# Patient Record
Sex: FEMALE | Race: WHITE | HISPANIC OR LATINO
[De-identification: names, ages, dates, MRNs, and addresses within clinical notes are randomized per-mention and may not be internally consistent; named-entity substitution may affect disease eponyms.]

---

## 2024-05-30 ENCOUNTER — APPOINTMENT (OUTPATIENT)
Dept: SURGERY | Facility: CLINIC | Age: 59
End: 2024-05-30
Payer: COMMERCIAL

## 2024-05-30 VITALS
HEIGHT: 59 IN | OXYGEN SATURATION: 94 % | DIASTOLIC BLOOD PRESSURE: 83 MMHG | SYSTOLIC BLOOD PRESSURE: 137 MMHG | WEIGHT: 128 LBS | HEART RATE: 85 BPM | BODY MASS INDEX: 25.8 KG/M2

## 2024-05-30 DIAGNOSIS — R79.89 OTHER SPECIFIED ABNORMAL FINDINGS OF BLOOD CHEMISTRY: ICD-10-CM

## 2024-05-30 PROCEDURE — 99204 OFFICE O/P NEW MOD 45 MIN: CPT

## 2024-05-30 NOTE — PLAN
[FreeTextEntry1] : Ms. AMANDA  was told significance of findings, options, risks and benefits were explained.  Informed consent for laparoscopic/possible open  cholecystectomy  and potential risks, benefits and alternatives (surgical options were discussed including non-surgical options or the option of no surgery) to the planned surgery were discussed in depth.  All surgical options were discussed including non-surgical treatments.  She wishes to proceed with surgery.  We will plan for surgery on at the next available date, pending any required insurance pre-certification or pre-approval. She agrees to obtain any necessary pre-operative evaluations and testing prior to surgery. Patient instructed to maintain a fat-free diet, and to seek immediate medical attention with any acute change or worsening of symptoms, including but not limited to abdominal pain, fever, chills, nausea, vomiting, or yellowing of the skin.  Patient advised to seek immediate medical attention with any acute change in symptoms or with the development of any new or worsening symptoms.  Patient's questions and concerns addressed to patient's satisfaction, and patient verbalized an understanding of the information discussed.

## 2024-05-30 NOTE — PHYSICAL EXAM
[Abdominal Masses] : No abdominal masses [Abdomen Tenderness] : ~T ~M No abdominal tenderness [Alert] : alert [Oriented to Person] : oriented to person [Oriented to Place] : oriented to place [Oriented to Time] : oriented to time [Calm] : calm [de-identified] : She  is alert, well-groomed, and in NAD   [de-identified] : anicteric.  Nasal mucosa pink, septum midline. Oral mucosa pink.  Tongue midline, Pharynx without exudates.   [de-identified] : Neck supple. Trachea midline. Thyroid isthmus barely palpable, lobes not felt.   [de-identified] :  no hernia

## 2024-05-30 NOTE — HISTORY OF PRESENT ILLNESS
[de-identified] : Ms. HILDA AMANDA is a 58 year  old patient who was referred by Dr. David Prasad  with the chief complaint of having GB stones.  she has no symptoms.     She reports no nausea or vomiting and no history of jaundice, acholia or acholuria.   Appetite is good and weight is stable.   She   has no family history of biliary tract disease.   She had an abdominal sonogram due to the elevated LFTs on  03/26/2024 which revealed multiple GB stones. CBD is normal

## 2024-05-30 NOTE — CONSULT LETTER
[Dear  ___] : Dear  [unfilled], [Consult Letter:] : I had the pleasure of evaluating your patient, [unfilled]. [Please see my note below.] : Please see my note below. [Consult Closing:] : Thank you very much for allowing me to participate in the care of this patient.  If you have any questions, please do not hesitate to contact me. [Sincerely,] : Sincerely, [FreeTextEntry3] : Devante Ly MD, FACS

## 2024-06-13 ENCOUNTER — OUTPATIENT (OUTPATIENT)
Dept: OUTPATIENT SERVICES | Facility: HOSPITAL | Age: 59
LOS: 1 days | End: 2024-06-13
Payer: COMMERCIAL

## 2024-06-13 VITALS
HEIGHT: 59 IN | RESPIRATION RATE: 16 BRPM | HEART RATE: 67 BPM | OXYGEN SATURATION: 98 % | WEIGHT: 125 LBS | TEMPERATURE: 98 F | SYSTOLIC BLOOD PRESSURE: 131 MMHG | DIASTOLIC BLOOD PRESSURE: 83 MMHG

## 2024-06-13 DIAGNOSIS — K81.9 CHOLECYSTITIS, UNSPECIFIED: ICD-10-CM

## 2024-06-13 DIAGNOSIS — Z01.818 ENCOUNTER FOR OTHER PREPROCEDURAL EXAMINATION: ICD-10-CM

## 2024-06-13 LAB — BLD GP AB SCN SERPL QL: SIGNIFICANT CHANGE UP

## 2024-06-13 NOTE — H&P PST ADULT - NSANTHOSAYNRD_GEN_A_CORE
No. NARCISO screening performed.  STOP BANG Legend: 0-2 = LOW Risk; 3-4 = INTERMEDIATE Risk; 5-8 = HIGH Risk

## 2024-06-13 NOTE — H&P PST ADULT - PROBLEM SELECTOR PLAN 1
Scheduled for laparoscopic cholecystectomy with intra-operative cholangiogram possible open on 6/19/2024  Preoperative instructions discussed and given to patient.   Patient agrees to follow up with surgeon's office for instructions prior to surgery   Discussed preprocedure skin preparation using  chlorhexidine gluconate 4% solution three days prior to  surgery - including the day of surgery  Instructed patient to avoid aspirin and aspirin products, over the counter medications such as vitamins and herbal medications, one week prior to surgery.  Take Tylenol as needed for pain  Follow up with PCP postoperatively for management of chronic conditions  Patient verbalized understanding of instructions

## 2024-06-13 NOTE — H&P PST ADULT - NSICDXPROCEDURE_GEN_ALL_CORE_FT
PROCEDURES:  Cholecystectomy, laparoscopic, with intraoperative cholangiogram 13-Jun-2024 17:14:30  Sandee Pérez

## 2024-06-13 NOTE — H&P PST ADULT - ASSESSMENT
60 y/o female with no significant PMHx was found to have elevated LFTs on routine labwork. F/U Abdominal Sonogram of 3/26/2024 revealed gallstones. She is diagnosed with cholecystitis, unspecified and is scheduled for laparoscopic cholecystectomy with intra-operative cholangiogram possible open on 6/19/2024 58 y/o female with no significant PMHx was found to have elevated LFTs on routine labwork. F/U Abdominal Sonogram of 3/26/2024 revealed gallstones. She is diagnosed with cholecystitis, unspecified     STOP BANG SCORE IS 1 60 y/o female with PMHx of HSV11 (valtrex) and seasonal allergies is diagnosed with cholecystitis, unspecified     STOP BANG SCORE IS 1

## 2024-06-14 PROCEDURE — 86850 RBC ANTIBODY SCREEN: CPT

## 2024-06-14 PROCEDURE — 86901 BLOOD TYPING SEROLOGIC RH(D): CPT

## 2024-06-14 PROCEDURE — 36415 COLL VENOUS BLD VENIPUNCTURE: CPT

## 2024-06-14 PROCEDURE — G0463: CPT

## 2024-06-14 PROCEDURE — 86900 BLOOD TYPING SEROLOGIC ABO: CPT

## 2024-06-18 ENCOUNTER — TRANSCRIPTION ENCOUNTER (OUTPATIENT)
Age: 59
End: 2024-06-18

## 2024-06-19 ENCOUNTER — APPOINTMENT (OUTPATIENT)
Dept: SURGERY | Facility: HOSPITAL | Age: 59
End: 2024-06-19

## 2024-06-19 ENCOUNTER — RESULT REVIEW (OUTPATIENT)
Age: 59
End: 2024-06-19

## 2024-06-19 ENCOUNTER — TRANSCRIPTION ENCOUNTER (OUTPATIENT)
Age: 59
End: 2024-06-19

## 2024-06-19 ENCOUNTER — OUTPATIENT (OUTPATIENT)
Dept: OUTPATIENT SERVICES | Facility: HOSPITAL | Age: 59
LOS: 1 days | End: 2024-06-19
Payer: COMMERCIAL

## 2024-06-19 VITALS
DIASTOLIC BLOOD PRESSURE: 85 MMHG | HEIGHT: 59 IN | SYSTOLIC BLOOD PRESSURE: 146 MMHG | TEMPERATURE: 98 F | WEIGHT: 125 LBS | HEART RATE: 80 BPM | RESPIRATION RATE: 16 BRPM | OXYGEN SATURATION: 98 %

## 2024-06-19 VITALS
SYSTOLIC BLOOD PRESSURE: 110 MMHG | TEMPERATURE: 97 F | OXYGEN SATURATION: 98 % | RESPIRATION RATE: 18 BRPM | DIASTOLIC BLOOD PRESSURE: 62 MMHG | HEART RATE: 56 BPM

## 2024-06-19 DIAGNOSIS — K81.9 CHOLECYSTITIS, UNSPECIFIED: ICD-10-CM

## 2024-06-19 LAB — BLD GP AB SCN SERPL QL: SIGNIFICANT CHANGE UP

## 2024-06-19 PROCEDURE — 88304 TISSUE EXAM BY PATHOLOGIST: CPT | Mod: 26

## 2024-06-19 PROCEDURE — 47563 LAPARO CHOLECYSTECTOMY/GRAPH: CPT

## 2024-06-19 PROCEDURE — 88304 TISSUE EXAM BY PATHOLOGIST: CPT

## 2024-06-19 PROCEDURE — 76000 FLUOROSCOPY <1 HR PHYS/QHP: CPT

## 2024-06-19 PROCEDURE — 47563 LAPARO CHOLECYSTECTOMY/GRAPH: CPT | Mod: AS

## 2024-06-19 PROCEDURE — 36415 COLL VENOUS BLD VENIPUNCTURE: CPT

## 2024-06-19 PROCEDURE — 86850 RBC ANTIBODY SCREEN: CPT

## 2024-06-19 PROCEDURE — 86900 BLOOD TYPING SEROLOGIC ABO: CPT

## 2024-06-19 PROCEDURE — 86901 BLOOD TYPING SEROLOGIC RH(D): CPT

## 2024-06-19 DEVICE — CLIP APPLIER COVIDIEN ENDOCLIP II 10MM MED/LG: Type: IMPLANTABLE DEVICE | Status: FUNCTIONAL

## 2024-06-19 DEVICE — IMPLANTABLE DEVICE: Type: IMPLANTABLE DEVICE | Status: FUNCTIONAL

## 2024-06-19 RX ORDER — VALACYCLOVIR 500 MG/1
1 TABLET, FILM COATED ORAL
Refills: 0 | DISCHARGE

## 2024-06-19 RX ORDER — OXYCODONE AND ACETAMINOPHEN 5; 325 MG/1; MG/1
1 TABLET ORAL ONCE
Refills: 0 | Status: DISCONTINUED | OUTPATIENT
Start: 2024-06-19 | End: 2024-07-03

## 2024-06-19 RX ORDER — FENTANYL CITRATE 50 UG/ML
25 INJECTION, SOLUTION INTRAMUSCULAR; INTRAVENOUS
Refills: 0 | Status: DISCONTINUED | OUTPATIENT
Start: 2024-06-19 | End: 2024-06-19

## 2024-06-19 RX ORDER — ONDANSETRON HYDROCHLORIDE 2 MG/ML
4 INJECTION INTRAMUSCULAR; INTRAVENOUS ONCE
Refills: 0 | Status: DISCONTINUED | OUTPATIENT
Start: 2024-06-19 | End: 2024-06-19

## 2024-06-19 RX ORDER — FENTANYL CITRATE 50 UG/ML
50 INJECTION, SOLUTION INTRAMUSCULAR; INTRAVENOUS
Refills: 0 | Status: DISCONTINUED | OUTPATIENT
Start: 2024-06-19 | End: 2024-06-19

## 2024-06-19 RX ORDER — DEXTROSE MONOHYDRATE AND SODIUM CHLORIDE 5; .3 G/100ML; G/100ML
1000 INJECTION, SOLUTION INTRAVENOUS
Refills: 0 | Status: DISCONTINUED | OUTPATIENT
Start: 2024-06-19 | End: 2024-06-19

## 2024-06-19 RX ORDER — SODIUM CHLORIDE 0.9 % (FLUSH) 0.9 %
3 SYRINGE (ML) INJECTION EVERY 8 HOURS
Refills: 0 | Status: DISCONTINUED | OUTPATIENT
Start: 2024-06-19 | End: 2024-06-19

## 2024-06-19 RX ORDER — OXYCODONE HYDROCHLORIDE 100 MG/5ML
1 SOLUTION ORAL
Qty: 4 | Refills: 0
Start: 2024-06-19

## 2024-06-20 PROBLEM — A60.00 HERPESVIRAL INFECTION OF UROGENITAL SYSTEM, UNSPECIFIED: Chronic | Status: ACTIVE | Noted: 2024-06-13

## 2024-06-25 LAB — SURGICAL PATHOLOGY STUDY: SIGNIFICANT CHANGE UP

## 2024-07-02 PROBLEM — K81.9 CHOLECYSTITIS: Status: ACTIVE | Noted: 2024-05-30

## 2024-07-08 ENCOUNTER — APPOINTMENT (OUTPATIENT)
Dept: SURGERY | Facility: CLINIC | Age: 59
End: 2024-07-08
Payer: COMMERCIAL

## 2024-07-08 DIAGNOSIS — K81.9 CHOLECYSTITIS, UNSPECIFIED: ICD-10-CM

## 2024-07-08 PROCEDURE — 99024 POSTOP FOLLOW-UP VISIT: CPT

## 2024-12-03 NOTE — H&P PST ADULT - NSICDXFAMILYHX_GEN_ALL_CORE_FT
What Stage Is The Melanoma?: Stage 0
What Is The Reason For Today's Visit?: Follow Up Melanoma
Year Excised?: 2018
FAMILY HISTORY:  Father  Still living? Yes, Estimated age: Age Unknown  FHx: hyperlipidemia, Age at diagnosis: Age Unknown    Mother  Still living? Unknown  FHx: hyperlipidemia, Age at diagnosis: Age Unknown

## (undated) DEVICE — DRSG TEGADERM 2.5X3"

## (undated) DEVICE — BLADE SURGICAL #10 CARBON

## (undated) DEVICE — DRSG BANDAID 0.75X3"

## (undated) DEVICE — INSUFFLATION NDL COVIDIEN SURGINEEDLE VERESS 120MM

## (undated) DEVICE — VENODYNE/SCD SLEEVE CALF MEDIUM

## (undated) DEVICE — BASIN SET SINGLE

## (undated) DEVICE — GLV 7.5 PROTEXIS (WHITE)

## (undated) DEVICE — TROCAR COVIDIEN VERSAONE BLADED SMOOTH 5MM STANDARD

## (undated) DEVICE — SOL IRR POUR NS 0.9% 1500ML

## (undated) DEVICE — DRSG STERISTRIPS 0.5 X 4"

## (undated) DEVICE — SUT POLYSORB 0 30" GU-46

## (undated) DEVICE — FOR-ESU VALLEYLAB T7E14982DX: Type: DURABLE MEDICAL EQUIPMENT

## (undated) DEVICE — WARMING BLANKET UPPER ADULT

## (undated) DEVICE — NDL HYPO REGULAR BEVEL 25G X 1.5" (BLUE)

## (undated) DEVICE — BLADE SURGICAL #15 CARBON

## (undated) DEVICE — SOL INJ NS 0.9% 1000ML

## (undated) DEVICE — SYR LUER LOK 10CC

## (undated) DEVICE — ELCTR FOOT CONTROL L WIRE LAPAROSCOPIC

## (undated) DEVICE — ELCTR GROUNDING PAD ADULT COVIDIEN

## (undated) DEVICE — D HELP - CLEARVIEW CLEARIFY SYSTEM

## (undated) DEVICE — TUBING STRYKER PNEUMOSURE HEATED RTP

## (undated) DEVICE — DRAPE C ARM UNIVERSAL

## (undated) DEVICE — SUCTION YANKAUER NO CONTROL VENT

## (undated) DEVICE — SYR ASEPTO

## (undated) DEVICE — PRECISION CUT SCISSOR MICROLINE MINI ENDOCUT DISP

## (undated) DEVICE — GLV 7 PROTEXIS (WHITE)

## (undated) DEVICE — DRAPE LIGHT HANDLE COVER (BLUE)

## (undated) DEVICE — SUT POLYSORB 4-0 27" P-12 UNDYED

## (undated) DEVICE — TROCAR COVIDIEN VERSAONE BLADED SMOOTH 11MM STANDARD

## (undated) DEVICE — PACK GENERAL LAPAROSCOPY

## (undated) DEVICE — ENDOCATCH 10MM SPECIMEN POUCH

## (undated) DEVICE — TUBING STRYKEFLOW II SUCTION / IRRIGATOR

## (undated) DEVICE — ELCTR BOVIE BLADE 3/4" EXTENDED LENGTH 6"

## (undated) DEVICE — SUT MAXON 1 60" T-60

## (undated) DEVICE — DRSG MASTISOL

## (undated) DEVICE — DRSG 2X2